# Patient Record
Sex: FEMALE | Race: WHITE | Employment: FULL TIME | URBAN - NONMETROPOLITAN AREA
[De-identification: names, ages, dates, MRNs, and addresses within clinical notes are randomized per-mention and may not be internally consistent; named-entity substitution may affect disease eponyms.]

---

## 2017-07-19 ENCOUNTER — HOSPITAL ENCOUNTER (OUTPATIENT)
Dept: WOMENS IMAGING | Age: 42
Discharge: HOME | End: 2017-07-19
Payer: COMMERCIAL

## 2017-07-19 DIAGNOSIS — Z12.31 ENCOUNTER FOR SCREENING MAMMOGRAM FOR BREAST CANCER: ICD-10-CM

## 2017-07-19 PROCEDURE — G0202 SCR MAMMO BI INCL CAD: HCPCS

## 2018-04-26 ENCOUNTER — OFFICE VISIT (OUTPATIENT)
Dept: OBGYN | Age: 43
End: 2018-04-26
Payer: COMMERCIAL

## 2018-04-26 VITALS
DIASTOLIC BLOOD PRESSURE: 62 MMHG | BODY MASS INDEX: 25.99 KG/M2 | HEIGHT: 65 IN | SYSTOLIC BLOOD PRESSURE: 114 MMHG | WEIGHT: 156 LBS

## 2018-04-26 DIAGNOSIS — Z00.00 WELLNESS EXAMINATION: Primary | ICD-10-CM

## 2018-04-26 DIAGNOSIS — Z01.419 WOMEN'S ANNUAL ROUTINE GYNECOLOGICAL EXAMINATION: ICD-10-CM

## 2018-04-26 PROCEDURE — 99396 PREV VISIT EST AGE 40-64: CPT | Performed by: ADVANCED PRACTICE MIDWIFE

## 2018-04-26 ASSESSMENT — PATIENT HEALTH QUESTIONNAIRE - PHQ9
2. FEELING DOWN, DEPRESSED OR HOPELESS: 1
SUM OF ALL RESPONSES TO PHQ9 QUESTIONS 1 & 2: 2
1. LITTLE INTEREST OR PLEASURE IN DOING THINGS: 1
SUM OF ALL RESPONSES TO PHQ QUESTIONS 1-9: 2

## 2018-04-26 ASSESSMENT — ENCOUNTER SYMPTOMS
CONSTIPATION: 0
SHORTNESS OF BREATH: 0
ABDOMINAL PAIN: 0
VOMITING: 0
NAUSEA: 0
BACK PAIN: 0
DIARRHEA: 0

## 2019-05-02 ENCOUNTER — OFFICE VISIT (OUTPATIENT)
Dept: OBGYN | Age: 44
End: 2019-05-02
Payer: COMMERCIAL

## 2019-05-02 ENCOUNTER — HOSPITAL ENCOUNTER (OUTPATIENT)
Age: 44
Setting detail: SPECIMEN
Discharge: HOME | End: 2019-05-02
Payer: COMMERCIAL

## 2019-05-02 VITALS
HEIGHT: 65 IN | SYSTOLIC BLOOD PRESSURE: 116 MMHG | WEIGHT: 158.6 LBS | BODY MASS INDEX: 26.42 KG/M2 | DIASTOLIC BLOOD PRESSURE: 78 MMHG

## 2019-05-02 DIAGNOSIS — Z00.00 WELLNESS EXAMINATION: Primary | ICD-10-CM

## 2019-05-02 DIAGNOSIS — Z01.419 ENCOUNTER FOR ANNUAL ROUTINE GYNECOLOGICAL EXAMINATION: ICD-10-CM

## 2019-05-02 LAB — HGB, POC: 14.1

## 2019-05-02 PROCEDURE — G0145 SCR C/V CYTO,THINLAYER,RESCR: HCPCS

## 2019-05-02 PROCEDURE — 99396 PREV VISIT EST AGE 40-64: CPT | Performed by: ADVANCED PRACTICE MIDWIFE

## 2019-05-02 ASSESSMENT — PATIENT HEALTH QUESTIONNAIRE - PHQ9
2. FEELING DOWN, DEPRESSED OR HOPELESS: 0
SUM OF ALL RESPONSES TO PHQ9 QUESTIONS 1 & 2: 0
1. LITTLE INTEREST OR PLEASURE IN DOING THINGS: 0
SUM OF ALL RESPONSES TO PHQ QUESTIONS 1-9: 0
SUM OF ALL RESPONSES TO PHQ QUESTIONS 1-9: 0

## 2019-05-02 ASSESSMENT — ENCOUNTER SYMPTOMS
BACK PAIN: 0
SORE THROAT: 0
SHORTNESS OF BREATH: 0
NAUSEA: 0
ABDOMINAL PAIN: 0
DIARRHEA: 0
CONSTIPATION: 0

## 2019-05-02 NOTE — PROGRESS NOTES
YEARLY PHYSICAL    Date of service: 2019    Cruz Ramirez  Is a 41 y.o.   female    PT's PCP is: Moe Corea MD     : 1975                                             Subjective:       Patient's last menstrual period was 04/10/2019 (exact date). Are your menses regular: yes    OB History    Para Term  AB Living   2 2 2 0 0 2   SAB TAB Ectopic Molar Multiple Live Births   0 0 0   0        # Outcome Date GA Lbr Kyle/2nd Weight Sex Delivery Anes PTL Lv   2 Term  39w0d  8 lb 5 oz (3.771 kg) F CS-Unspec      1 Term  39w0d  7 lb 12 oz (3.515 kg) F CS-Unspec         Birth Comments: decr FHR        Social History     Tobacco Use   Smoking Status Never Smoker   Smokeless Tobacco Never Used        Social History     Substance and Sexual Activity   Alcohol Use Yes    Comment: social       Family History   Problem Relation Age of Onset    Breast Cancer Maternal Grandmother     Diabetes Sister     Diabetes Other     Diabetes Father     Diabetes Sister     Diabetes Brother        Any family history of breast or ovarian cancer: Yes    Any family history of blood clots: No      Allergies: Corn-containing products; Pineapple; and Shellfish-derived products    No current outpatient medications on file.     Social History     Substance and Sexual Activity   Sexual Activity Yes    Partners: Male    Birth control/protection: Surgical       Any bleeding or pain with intercourse: No    Last Yearly:  2018    Last pap: 2017    Last HPV: 2016    Last Mammogram: 2017    Last Dexascan na    Last colorectal screen- type:na*  date  na    Do you do self breast exams: No    Past Medical History:   Diagnosis Date    Fibromyalgia     Thyroid disease        Past Surgical History:   Procedure Laterality Date    BREAST REDUCTION SURGERY  2010     SECTION  ,     TUBAL LIGATION         Family History Problem Relation Age of Onset    Breast Cancer Maternal Grandmother     Diabetes Sister     Diabetes Other     Diabetes Father     Diabetes Sister     Diabetes Brother        Chief Complaint   Patient presents with    Gynecologic Exam     Yearly exam. Last pap04/21/2017 negative, last HPV 2016 not detected. PE:  Vital Signs  Blood pressure 116/78, height 5' 5\" (1.651 m), weight 158 lb 9.6 oz (71.9 kg), last menstrual period 04/10/2019, not currently breastfeeding. Labs:    No results found for this visit on 05/02/19. NURSE: Naveen MILLAN    HPI: here for annual exam, denies c/o    Review of Systems   Constitutional: Negative. HENT: Negative for congestion and sore throat. Respiratory: Negative for shortness of breath. Cardiovascular: Negative for chest pain. Gastrointestinal: Negative for abdominal pain, constipation, diarrhea and nausea. Genitourinary: Negative for dysuria. Musculoskeletal: Negative for back pain. Skin: Negative for rash. Neurological: Negative for headaches. Psychiatric/Behavioral: The patient is not nervous/anxious. Objective  Lymphatic:   no lymphadenopathy  Heent:   negative   Cor: regular rate and rhythm, no murmurs              Pul:clear to auscultation bilaterally- no wheezes, rales or rhonchi, normal air movement, no respiratory distress      GI: Abdomen soft, non-tender. BS normal. No masses,  No organomegaly           Extremities: normal strength, tone, and muscle mass   Breasts: Breast:normal appearance, no masses or tenderness   Pelvic Exam: GENITAL/URINARY:  External Genitalia:  General appearance; normal, Hair distribution; normal, Lesions absent  Urethral Meatus:  Size normal, Location normal, Lesions absent, Prolapse absent  Urethra:   Fullness absent, Masses absent  Bladder:  Fullness absent, Masses absent, Tenderness absent, Cystocele absent  Vagina:  General appearance normal, Estrogen effect normal, Discharge absent, Lesions absent, Pelvic support normal  Cervix:  General appearance normal, Lesions absent, Discharge absent, Tenderness absent, Enlargement absent, Nodularity absent  Uterus:  Size normal, Tenderness absent  Adenexa: Masses absent, Tenderness absent  Anus/Perineum:  Lesions absent and Masses absent                                    Vaginal discharge: no vaginal discharge                            Assessment and Plan          Diagnosis Orders   1. Wellness examination  POCT hemoglobin   2. Encounter for annual routine gynecological examination  PAP SMEAR    SHER DIGITAL SCREEN W CAD BILATERAL             Nimo More does not currently have medications on file. Return in about 1 year (around 5/2/2020) for yearly. She was also counseled on her preventative health maintenance recommendations and follow-up. There are no Patient Instructions on file for this visit.     Esau Castle,5/2/2019 9:30 AM

## 2019-05-10 LAB — CYTOLOGY REPORT: NORMAL

## 2019-05-22 ENCOUNTER — HOSPITAL ENCOUNTER (OUTPATIENT)
Dept: WOMENS IMAGING | Age: 44
Discharge: HOME | End: 2019-05-24
Payer: COMMERCIAL

## 2019-05-22 DIAGNOSIS — Z01.419 ENCOUNTER FOR ANNUAL ROUTINE GYNECOLOGICAL EXAMINATION: ICD-10-CM

## 2019-05-22 PROCEDURE — 77063 BREAST TOMOSYNTHESIS BI: CPT

## 2020-09-02 ENCOUNTER — OFFICE VISIT (OUTPATIENT)
Dept: OBGYN | Age: 45
End: 2020-09-02
Payer: COMMERCIAL

## 2020-09-02 VITALS
SYSTOLIC BLOOD PRESSURE: 118 MMHG | HEIGHT: 65 IN | BODY MASS INDEX: 25.66 KG/M2 | WEIGHT: 154 LBS | DIASTOLIC BLOOD PRESSURE: 74 MMHG

## 2020-09-02 PROCEDURE — 99396 PREV VISIT EST AGE 40-64: CPT | Performed by: ADVANCED PRACTICE MIDWIFE

## 2020-09-02 ASSESSMENT — PATIENT HEALTH QUESTIONNAIRE - PHQ9
2. FEELING DOWN, DEPRESSED OR HOPELESS: 1
SUM OF ALL RESPONSES TO PHQ QUESTIONS 1-9: 2
1. LITTLE INTEREST OR PLEASURE IN DOING THINGS: 1
SUM OF ALL RESPONSES TO PHQ9 QUESTIONS 1 & 2: 2
SUM OF ALL RESPONSES TO PHQ QUESTIONS 1-9: 2

## 2020-09-02 NOTE — PROGRESS NOTES
Problem Relation Age of Onset    Breast Cancer Maternal Grandmother     Diabetes Sister     Diabetes Other     Diabetes Father     Diabetes Sister     Diabetes Brother        Chief Complaint   Patient presents with    Gynecologic Exam      Yearly exam. Last pap 05/02/2019 negaitve, last HPV 2016 not detect  ed. C/O longer heavier periods, Patient periods  last 7-8 days khanh has 24 days between periods. PE:  Vital Signs  Blood pressure 118/74, height 5' 5\" (1.651 m), weight 154 lb (69.9 kg), last menstrual period 08/17/2020, not currently breastfeeding. Labs:    No results found for this visit on 09/02/20. NURSE: Jose MILLAN    HPI: hre for annual gyn exam, but c/o heavy irregular menses    Review of Systems   Constitutional: Negative. HENT: Negative for congestion and sore throat. Respiratory: Negative for shortness of breath. Cardiovascular: Negative for chest pain. Gastrointestinal: Negative for abdominal pain. Genitourinary: Positive for menstrual problem. Negative for dysuria. Musculoskeletal: Negative for back pain. Skin: Negative for rash. Neurological: Negative for headaches. Psychiatric/Behavioral: The patient is not nervous/anxious. Reports some mixed feelings with anxieity re: job and potential change         Objective  Lymphatic:   no lymphadenopathy  Heent:   negative   Cor: regular rate and rhythm, no murmurs              Pul:clear to auscultation bilaterally- no wheezes, rales or rhonchi, normal air movement, no respiratory distress      GI: Abdomen soft, non-tender. BS normal. No masses,  No organomegaly           Extremities: normal strength, tone, and muscle mass   Breasts: Breast:normal appearance, no masses or tenderness   Pelvic Exam: GENITAL/URINARY:  External Genitalia:  General appearance; normal, Hair distribution; normal, Lesions absent  Urethral Meatus:  Size normal, Location normal, Lesions absent, Prolapse absent  Urethra:   Fullness absent, Masses absent  Bladder:  Fullness absent, Masses absent, Tenderness absent, Cystocele absent  Vagina:  General appearance normal, Estrogen effect normal, Discharge absent, Lesions absent, Pelvic support normal  Cervix:  General appearance normal, Lesions absent, Discharge absent, Tenderness absent, Enlargement absent, Nodularity absent  Uterus:  Size normal, Tenderness absent  Adenexa: Masses absent, Tenderness absent  Anus/Perineum:  Lesions absent and Masses absent                                                               Assessment and Plan          Diagnosis Orders   1. Encounter for annual routine gynecological examination     2. Screening mammogram, encounter for  SHER DIGITAL SCREEN W OR WO CAD BILATERAL   3. Menorrhagia with irregular cycle  US NON OB TRANSVAGINAL       patient desires ablation      Nimo DAN Minerva Parada does not currently have medications on file. Return in about 2 weeks (around 9/16/2020) for gyn US for and endo bx immediately following next menses,. She was also counseled on her preventative health maintenance recommendations and follow-up. There are no Patient Instructions on file for this visit.     Mesfin Castle,9/4/2020 4:25 PM

## 2020-09-04 ASSESSMENT — ENCOUNTER SYMPTOMS
ABDOMINAL PAIN: 0
SORE THROAT: 0
SHORTNESS OF BREATH: 0
BACK PAIN: 0

## 2020-09-22 ENCOUNTER — PROCEDURE VISIT (OUTPATIENT)
Dept: OBGYN | Age: 45
End: 2020-09-22
Payer: COMMERCIAL

## 2020-09-22 ENCOUNTER — HOSPITAL ENCOUNTER (OUTPATIENT)
Age: 45
Setting detail: SPECIMEN
Discharge: HOME | End: 2020-09-22
Payer: COMMERCIAL

## 2020-09-22 VITALS
SYSTOLIC BLOOD PRESSURE: 112 MMHG | HEIGHT: 65 IN | BODY MASS INDEX: 25.33 KG/M2 | WEIGHT: 152 LBS | DIASTOLIC BLOOD PRESSURE: 70 MMHG

## 2020-09-22 PROCEDURE — 58100 BIOPSY OF UTERUS LINING: CPT | Performed by: ADVANCED PRACTICE MIDWIFE

## 2020-09-22 PROCEDURE — 88305 TISSUE EXAM BY PATHOLOGIST: CPT

## 2020-09-22 NOTE — PROGRESS NOTES
communications  Well-nourished    Speculum exam, cervix visualized, cleaned with betadine, tenaculu applied to anterior lip, pipelle advanced to 11 cm, copious amounts of tissue aspirated and sent to pathology, instruments removed, patient tolerated procedure well                     Results reviewed today:    No results found for this visit on 09/22/20. Assessment and Plan          Diagnosis Orders   1. Menorrhagia with regular cycle  Surgical Pathology    KY BIOPSY OF UTERUS LINING             Nimo Lopez does not currently have medications on file. Return keep appt with Dr. Jordan Wakefield, discuss ablation, for will call with results. She was also counseled on her preventative health maintenance recommendations and follow-up. There are no Patient Instructions on file for this visit.     Yan Castle,9/22/2020 2:33 PM

## 2020-09-24 LAB — SURGICAL PATHOLOGY REPORT: NORMAL

## 2020-10-06 ENCOUNTER — OFFICE VISIT (OUTPATIENT)
Dept: OBGYN | Age: 45
End: 2020-10-06
Payer: COMMERCIAL

## 2020-10-06 VITALS
DIASTOLIC BLOOD PRESSURE: 70 MMHG | WEIGHT: 151.6 LBS | HEIGHT: 66 IN | BODY MASS INDEX: 24.36 KG/M2 | SYSTOLIC BLOOD PRESSURE: 110 MMHG

## 2020-10-06 PROCEDURE — G8484 FLU IMMUNIZE NO ADMIN: HCPCS | Performed by: OBSTETRICS & GYNECOLOGY

## 2020-10-06 PROCEDURE — G8427 DOCREV CUR MEDS BY ELIG CLIN: HCPCS | Performed by: OBSTETRICS & GYNECOLOGY

## 2020-10-06 PROCEDURE — 99213 OFFICE O/P EST LOW 20 MIN: CPT | Performed by: OBSTETRICS & GYNECOLOGY

## 2020-10-06 PROCEDURE — 1036F TOBACCO NON-USER: CPT | Performed by: OBSTETRICS & GYNECOLOGY

## 2020-10-06 PROCEDURE — G8420 CALC BMI NORM PARAMETERS: HCPCS | Performed by: OBSTETRICS & GYNECOLOGY

## 2020-10-06 NOTE — PROGRESS NOTES
DATE OF VISIT:  10/6/20    PATIENT NAME:  Enzo Bence D Johnney Bimler OF BIRTH: 1975    REASON FOR VISIT:    Chief Complaint   Patient presents with    Menstrual Problem     Here to discuss options for her cycles, they are heavy and long, cycles are coming every 24 days and last 8 days (start of one to start of another). First 3 are flooding, wearing a super tampon and heavy pad a bleeding through her clothes, changes pad/tampon every 45-60 minutes, some months the heavier bleeding will last until day 6    Pelvic Pain     One month will be very painful with cramping and the alternate months she will be super emotional        HISTORY OF PRESENT ILLNESS:  Pt here for heavy menses - cycle q 24 days lasting 8 days with 6 days being heavy; at the heaviest having to change pad/tampon q 45 min to an hour; cramping/painful; have been bad for last couple of years    USN showed 10.7x6.4 cm ut with thickened endometrium - 1.6 cm      Patient's last menstrual period was 09/11/2020 (exact date). Vitals:    10/06/20 1543   BP: 110/70   Weight: 151 lb 9.6 oz (68.8 kg)   Height: 5' 6\" (1.676 m)     Body mass index is 24.47 kg/m². Allergies   Allergen Reactions    Corn-Containing Products     Pineapple     Shellfish-Derived Products      No current outpatient medications on file. No current facility-administered medications for this visit.       Social History     Socioeconomic History    Marital status:      Spouse name: None    Number of children: None    Years of education: None    Highest education level: None   Occupational History    None   Social Needs    Financial resource strain: None    Food insecurity     Worry: None     Inability: None    Transportation needs     Medical: None     Non-medical: None   Tobacco Use    Smoking status: Never Smoker    Smokeless tobacco: Never Used   Substance and Sexual Activity    Alcohol use: Yes     Comment: social    Drug use: No    Sexual activity:

## 2020-10-16 ENCOUNTER — HOSPITAL ENCOUNTER (OUTPATIENT)
Dept: WOMENS IMAGING | Age: 45
Discharge: HOME | End: 2020-10-18
Payer: COMMERCIAL

## 2020-10-16 ENCOUNTER — TELEPHONE (OUTPATIENT)
Dept: OBGYN CLINIC | Age: 45
End: 2020-10-16

## 2020-10-16 PROCEDURE — 77063 BREAST TOMOSYNTHESIS BI: CPT

## 2020-10-16 NOTE — TELEPHONE ENCOUNTER
Spoke to patient and confirmed surgery for 12/28/20 at [facility]. She is aware that a nurse from [facility] will call her to complete a phone interview and we will get labs on admission. She will come in to see Dr. Shana Casillas prior to surgery and will follow up 4 weeks after surgery. Appointments scheduled. Patient is an  and will plan to take 12/28 and 12/29 off work, she will let me know if she needs a note for work. Patient verbalized understanding, no further questions/concerns voiced.

## 2020-12-21 ENCOUNTER — HOSPITAL ENCOUNTER (OUTPATIENT)
Dept: PREADMISSION TESTING | Age: 45
Setting detail: SPECIMEN
Discharge: HOME | End: 2020-12-25
Payer: COMMERCIAL

## 2020-12-21 ENCOUNTER — OFFICE VISIT (OUTPATIENT)
Dept: OBGYN | Age: 45
End: 2020-12-21
Payer: COMMERCIAL

## 2020-12-21 VITALS
HEIGHT: 65 IN | SYSTOLIC BLOOD PRESSURE: 122 MMHG | WEIGHT: 152 LBS | BODY MASS INDEX: 25.33 KG/M2 | DIASTOLIC BLOOD PRESSURE: 78 MMHG

## 2020-12-21 PROCEDURE — G8419 CALC BMI OUT NRM PARAM NOF/U: HCPCS | Performed by: OBSTETRICS & GYNECOLOGY

## 2020-12-21 PROCEDURE — G8427 DOCREV CUR MEDS BY ELIG CLIN: HCPCS | Performed by: OBSTETRICS & GYNECOLOGY

## 2020-12-21 PROCEDURE — C9803 HOPD COVID-19 SPEC COLLECT: HCPCS

## 2020-12-21 PROCEDURE — U0003 INFECTIOUS AGENT DETECTION BY NUCLEIC ACID (DNA OR RNA); SEVERE ACUTE RESPIRATORY SYNDROME CORONAVIRUS 2 (SARS-COV-2) (CORONAVIRUS DISEASE [COVID-19]), AMPLIFIED PROBE TECHNIQUE, MAKING USE OF HIGH THROUGHPUT TECHNOLOGIES AS DESCRIBED BY CMS-2020-01-R: HCPCS

## 2020-12-21 PROCEDURE — 1036F TOBACCO NON-USER: CPT | Performed by: OBSTETRICS & GYNECOLOGY

## 2020-12-21 PROCEDURE — G8484 FLU IMMUNIZE NO ADMIN: HCPCS | Performed by: OBSTETRICS & GYNECOLOGY

## 2020-12-21 PROCEDURE — 99213 OFFICE O/P EST LOW 20 MIN: CPT | Performed by: OBSTETRICS & GYNECOLOGY

## 2020-12-21 NOTE — PROGRESS NOTES
DATE OF VISIT:  12/21/20    PATIENT NAME:  Allie Salinas OF BIRTH: 1975    REASON FOR VISIT:    Chief Complaint   Patient presents with    Pre-op Exam     Patient here to sign surgical consents; patient having D&C ablation on 12/28/20. HISTORY OF PRESENT ILLNESS:  Pt here for heavy menses - cycle q 24 days lasting 8 days with 6 days being heavy; at the heaviest having to change pad/tampon q 45 min to an hour; cramping/painful; have been bad for last couple of years     USN showed 10.7x6.4 cm ut with thickened endometrium - 1.6 cm      Patient's last menstrual period was 11/27/2020. Vitals:    12/21/20 1713   BP: 122/78   Weight: 152 lb (68.9 kg)   Height: 5' 5\" (1.651 m)     Body mass index is 25.29 kg/m². Allergies   Allergen Reactions    Corn-Containing Products     Pineapple     Shellfish-Derived Products      No current outpatient medications on file. No current facility-administered medications for this visit.       Social History     Socioeconomic History    Marital status:      Spouse name: None    Number of children: None    Years of education: None    Highest education level: None   Occupational History    None   Social Needs    Financial resource strain: None    Food insecurity     Worry: None     Inability: None    Transportation needs     Medical: None     Non-medical: None   Tobacco Use    Smoking status: Never Smoker    Smokeless tobacco: Never Used   Substance and Sexual Activity    Alcohol use: Yes     Comment: social    Drug use: No    Sexual activity: Yes     Partners: Male     Birth control/protection: Surgical     Comment: TL   Lifestyle    Physical activity     Days per week: None     Minutes per session: None    Stress: None   Relationships    Social connections     Talks on phone: None     Gets together: None     Attends Church service: None     Active member of club or organization: None Attends meetings of clubs or organizations: None     Relationship status: None    Intimate partner violence     Fear of current or ex partner: None     Emotionally abused: None     Physically abused: None     Forced sexual activity: None   Other Topics Concern    None   Social History Narrative    None       REVIEW OF SYSTEMS:  Review of Systems   Constitutional: Negative for chills and fever. Genitourinary: Positive for menstrual problem. Negative for pelvic pain. PHYSICAL EXAM:  /78   Ht 5' 5\" (1.651 m)   Wt 152 lb (68.9 kg)   LMP 11/27/2020   BMI 25.29 kg/m²   Physical Exam  Constitutional:       Appearance: Normal appearance. HENT:      Head: Normocephalic and atraumatic. Neck:      Musculoskeletal: Normal range of motion. Cardiovascular:      Rate and Rhythm: Normal rate. Pulmonary:      Effort: Pulmonary effort is normal.   Musculoskeletal: Normal range of motion. Neurological:      Mental Status: She is alert and oriented to person, place, and time. Skin:     General: Skin is warm and dry. Psychiatric:         Mood and Affect: Mood normal.         Behavior: Behavior normal.         Thought Content: Thought content normal.         Judgment: Judgment normal.         ASSESSMENT:      1. Menorrhagia with regular cycle    2. Dysmenorrhea    3. Enlarged uterus        PLAN:  No orders of the defined types were placed in this encounter. Return in about 1 week (around 12/28/2020) for hysteroscopy d&c bertha.        Electronically signed by Robinson Newton DO on 12/21/20

## 2020-12-22 LAB
SARS-COV-2, RAPID: NORMAL
SARS-COV-2: NORMAL
SARS-COV-2: NOT DETECTED
SOURCE: NORMAL

## 2020-12-23 ENCOUNTER — ANESTHESIA EVENT (OUTPATIENT)
Dept: OPERATING ROOM | Age: 45
End: 2020-12-23
Payer: COMMERCIAL

## 2020-12-23 ENCOUNTER — TELEPHONE (OUTPATIENT)
Dept: PRIMARY CARE CLINIC | Age: 45
End: 2020-12-23

## 2020-12-28 ENCOUNTER — ANESTHESIA (OUTPATIENT)
Dept: OPERATING ROOM | Age: 45
End: 2020-12-28
Payer: COMMERCIAL

## 2020-12-28 ENCOUNTER — HOSPITAL ENCOUNTER (OUTPATIENT)
Age: 45
Setting detail: OUTPATIENT SURGERY
Discharge: HOME | End: 2020-12-28
Attending: OBSTETRICS & GYNECOLOGY | Admitting: OBSTETRICS & GYNECOLOGY
Payer: COMMERCIAL

## 2020-12-28 VITALS
HEART RATE: 51 BPM | HEIGHT: 65 IN | SYSTOLIC BLOOD PRESSURE: 93 MMHG | BODY MASS INDEX: 25.66 KG/M2 | OXYGEN SATURATION: 99 % | WEIGHT: 154 LBS | DIASTOLIC BLOOD PRESSURE: 90 MMHG | TEMPERATURE: 97.6 F | RESPIRATION RATE: 16 BRPM

## 2020-12-28 VITALS
RESPIRATION RATE: 24 BRPM | DIASTOLIC BLOOD PRESSURE: 78 MMHG | SYSTOLIC BLOOD PRESSURE: 138 MMHG | OXYGEN SATURATION: 100 %

## 2020-12-28 PROCEDURE — 3600000013 HC SURGERY LEVEL 3 ADDTL 15MIN: Performed by: OBSTETRICS & GYNECOLOGY

## 2020-12-28 PROCEDURE — 2500000003 HC RX 250 WO HCPCS: Performed by: NURSE ANESTHETIST, CERTIFIED REGISTERED

## 2020-12-28 PROCEDURE — 2720000010 HC SURG SUPPLY STERILE: Performed by: OBSTETRICS & GYNECOLOGY

## 2020-12-28 PROCEDURE — 6370000000 HC RX 637 (ALT 250 FOR IP): Performed by: NURSE ANESTHETIST, CERTIFIED REGISTERED

## 2020-12-28 PROCEDURE — 3700000000 HC ANESTHESIA ATTENDED CARE: Performed by: OBSTETRICS & GYNECOLOGY

## 2020-12-28 PROCEDURE — 3700000001 HC ADD 15 MINUTES (ANESTHESIA): Performed by: OBSTETRICS & GYNECOLOGY

## 2020-12-28 PROCEDURE — 7100000011 HC PHASE II RECOVERY - ADDTL 15 MIN: Performed by: OBSTETRICS & GYNECOLOGY

## 2020-12-28 PROCEDURE — 2580000003 HC RX 258: Performed by: OBSTETRICS & GYNECOLOGY

## 2020-12-28 PROCEDURE — 3600000003 HC SURGERY LEVEL 3 BASE: Performed by: OBSTETRICS & GYNECOLOGY

## 2020-12-28 PROCEDURE — 2709999900 HC NON-CHARGEABLE SUPPLY: Performed by: OBSTETRICS & GYNECOLOGY

## 2020-12-28 PROCEDURE — 58563 HYSTEROSCOPY ABLATION: CPT | Performed by: OBSTETRICS & GYNECOLOGY

## 2020-12-28 PROCEDURE — 88305 TISSUE EXAM BY PATHOLOGIST: CPT

## 2020-12-28 PROCEDURE — 6370000000 HC RX 637 (ALT 250 FOR IP): Performed by: OBSTETRICS & GYNECOLOGY

## 2020-12-28 PROCEDURE — 7100000010 HC PHASE II RECOVERY - FIRST 15 MIN: Performed by: OBSTETRICS & GYNECOLOGY

## 2020-12-28 PROCEDURE — 6360000002 HC RX W HCPCS: Performed by: NURSE ANESTHETIST, CERTIFIED REGISTERED

## 2020-12-28 RX ORDER — PROPOFOL 10 MG/ML
INJECTION, EMULSION INTRAVENOUS PRN
Status: DISCONTINUED | OUTPATIENT
Start: 2020-12-28 | End: 2020-12-28 | Stop reason: SDUPTHER

## 2020-12-28 RX ORDER — ATROPA BELLADONNA AND OPIUM 16.2; 3 MG/1; MG/1
SUPPOSITORY RECTAL PRN
Status: DISCONTINUED | OUTPATIENT
Start: 2020-12-28 | End: 2020-12-28 | Stop reason: SDUPTHER

## 2020-12-28 RX ORDER — SODIUM CHLORIDE, SODIUM LACTATE, POTASSIUM CHLORIDE, CALCIUM CHLORIDE 600; 310; 30; 20 MG/100ML; MG/100ML; MG/100ML; MG/100ML
INJECTION, SOLUTION INTRAVENOUS CONTINUOUS
Status: DISCONTINUED | OUTPATIENT
Start: 2020-12-28 | End: 2020-12-28 | Stop reason: HOSPADM

## 2020-12-28 RX ORDER — OXYCODONE HYDROCHLORIDE 5 MG/1
5 TABLET ORAL
Status: DISCONTINUED | OUTPATIENT
Start: 2020-12-28 | End: 2020-12-28 | Stop reason: HOSPADM

## 2020-12-28 RX ORDER — PROPOFOL 10 MG/ML
INJECTION, EMULSION INTRAVENOUS CONTINUOUS PRN
Status: DISCONTINUED | OUTPATIENT
Start: 2020-12-28 | End: 2020-12-28 | Stop reason: SDUPTHER

## 2020-12-28 RX ORDER — HYDROCODONE BITARTRATE AND ACETAMINOPHEN 5; 325 MG/1; MG/1
1 TABLET ORAL EVERY 4 HOURS PRN
Qty: 10 TABLET | Refills: 0 | Status: SHIPPED | OUTPATIENT
Start: 2020-12-28 | End: 2020-12-30

## 2020-12-28 RX ORDER — ACETAMINOPHEN 325 MG/1
650 TABLET ORAL ONCE
Status: COMPLETED | OUTPATIENT
Start: 2020-12-28 | End: 2020-12-28

## 2020-12-28 RX ORDER — LIDOCAINE HYDROCHLORIDE 20 MG/ML
INJECTION, SOLUTION EPIDURAL; INFILTRATION; INTRACAUDAL; PERINEURAL PRN
Status: DISCONTINUED | OUTPATIENT
Start: 2020-12-28 | End: 2020-12-28 | Stop reason: SDUPTHER

## 2020-12-28 RX ORDER — FENTANYL CITRATE 50 UG/ML
25 INJECTION, SOLUTION INTRAMUSCULAR; INTRAVENOUS EVERY 5 MIN PRN
Status: DISCONTINUED | OUTPATIENT
Start: 2020-12-28 | End: 2020-12-28 | Stop reason: HOSPADM

## 2020-12-28 RX ORDER — KETOROLAC TROMETHAMINE 30 MG/ML
INJECTION, SOLUTION INTRAMUSCULAR; INTRAVENOUS PRN
Status: DISCONTINUED | OUTPATIENT
Start: 2020-12-28 | End: 2020-12-28 | Stop reason: SDUPTHER

## 2020-12-28 RX ORDER — ONDANSETRON 2 MG/ML
4 INJECTION INTRAMUSCULAR; INTRAVENOUS
Status: DISCONTINUED | OUTPATIENT
Start: 2020-12-28 | End: 2020-12-28 | Stop reason: HOSPADM

## 2020-12-28 RX ORDER — DIMENHYDRINATE 50 MG/1
50 TABLET ORAL ONCE
Status: COMPLETED | OUTPATIENT
Start: 2020-12-28 | End: 2020-12-28

## 2020-12-28 RX ORDER — FENTANYL CITRATE 50 UG/ML
50 INJECTION, SOLUTION INTRAMUSCULAR; INTRAVENOUS EVERY 5 MIN PRN
Status: DISCONTINUED | OUTPATIENT
Start: 2020-12-28 | End: 2020-12-28 | Stop reason: HOSPADM

## 2020-12-28 RX ORDER — KETOROLAC TROMETHAMINE 10 MG/1
10 TABLET, FILM COATED ORAL EVERY 6 HOURS PRN
Qty: 12 TABLET | Refills: 0 | Status: SHIPPED | OUTPATIENT
Start: 2020-12-28 | End: 2021-01-25

## 2020-12-28 RX ORDER — MIDAZOLAM HYDROCHLORIDE 1 MG/ML
INJECTION INTRAMUSCULAR; INTRAVENOUS PRN
Status: DISCONTINUED | OUTPATIENT
Start: 2020-12-28 | End: 2020-12-28 | Stop reason: SDUPTHER

## 2020-12-28 RX ADMIN — SODIUM CHLORIDE, POTASSIUM CHLORIDE, SODIUM LACTATE AND CALCIUM CHLORIDE: 600; 310; 30; 20 INJECTION, SOLUTION INTRAVENOUS at 10:48

## 2020-12-28 RX ADMIN — KETOROLAC TROMETHAMINE 30 MG: 30 INJECTION, SOLUTION INTRAMUSCULAR; INTRAVENOUS at 12:06

## 2020-12-28 RX ADMIN — MIDAZOLAM 1 MG: 1 INJECTION INTRAMUSCULAR; INTRAVENOUS at 11:44

## 2020-12-28 RX ADMIN — ATROPA BELLADONNA AND OPIUM 30 MG: 16.2; 3 SUPPOSITORY RECTAL at 11:53

## 2020-12-28 RX ADMIN — ACETAMINOPHEN 650 MG: 325 TABLET, FILM COATED ORAL at 10:35

## 2020-12-28 RX ADMIN — DIMENHYDRINATE 50 MG: 50 TABLET ORAL at 10:35

## 2020-12-28 RX ADMIN — LIDOCAINE HYDROCHLORIDE 100 MG: 20 INJECTION, SOLUTION EPIDURAL; INFILTRATION; INTRACAUDAL; PERINEURAL at 11:47

## 2020-12-28 RX ADMIN — MIDAZOLAM 1 MG: 1 INJECTION INTRAMUSCULAR; INTRAVENOUS at 11:47

## 2020-12-28 RX ADMIN — PROPOFOL 20 MG: 10 INJECTION, EMULSION INTRAVENOUS at 11:49

## 2020-12-28 RX ADMIN — PROPOFOL 120 MCG/KG/MIN: 10 INJECTION, EMULSION INTRAVENOUS at 11:49

## 2020-12-28 ASSESSMENT — PAIN - FUNCTIONAL ASSESSMENT: PAIN_FUNCTIONAL_ASSESSMENT: 0-10

## 2020-12-28 NOTE — OP NOTE
Preoperative diagnosis: 1. Menorrhagia    Postoperative diagnosis: Same    Procedure: Hysteroscopy D&C with Novasure Endometrial Ablation    Surgeon: Dr. Norvin Goldberg    Assistant:  none    Anesthesia: general    Estimated blood loss: less than 10 mL    Fluids: Lactated Ringer's as per the anesthesia record    Urine:  drained with prep    Condition:  Stable    Complications:  None    Specimen:  Endometrial currettings    Findings: Patient had an approximately 9 cm uterus with a thickened endometrium without any obvious fibroids or polyps. The cavity was 6 x 3 cm in size. Procedure: The patient was taken to the operating room where she was prepped and draped in the usual sterile fashion. A weighted speculum was placed in the patient's vagina and the anterior lip of the cervix was grasped with a single-tooth tenaculum. The cervix was progressively dilated and the uterus was sounded. A hysteroscope was introduced with the findings noted above. The hysteroscope was then removed. A sharp currettage was performed. The specimen was handed off to pathology labelled as endometrial currettings. The NovaSure array was then introduced and deployed. A seating technique was utilized to assess the cavity width. A CO2 test was performed and passed. The ablation was then carried out at 80 W for 80 seconds. Once this was completed, the array was withdrawn into its protective sheath and removed. The tenaculum was released with hemostasis being noted and the weighted speculum was removed. Sponge, lap, needle, and instrument counts were correct ×2. The patient was taken to the recovery area in apparently stable condition.

## 2020-12-28 NOTE — PROGRESS NOTES
Informed anesthesia of low pulse and B/P pt. States I normally run that low. No dizziness or lightheadedness noted.  ok'd for discharge

## 2020-12-28 NOTE — PROGRESS NOTES
Ambulated to bathroom per one assist, gait steady no dizziness or lightheadedness noted. Voided states not very much on lynette pad.

## 2020-12-28 NOTE — H&P
HISTORY AND PHYSICAL             Date: 2020        Patient Name: Fany Jung     YOB: 1975      Age:  39 y.o. Chief Complaint   No chief complaint on file. History Obtained From   patient    History of Present Illness   Pt here for heavy menses - cycle q 24 days lasting 8 days with 6 days being heavy; at the heaviest having to change pad/tampon q 45 min to an hour; cramping/painful; have been bad for last couple of years     USN showed 10.7x6.4 cm ut with thickened endometrium - 1.6 cm    Past Medical History     Past Medical History:   Diagnosis Date    Fibromyalgia     Thyroid disease         Past Surgical History     Past Surgical History:   Procedure Laterality Date    BREAST REDUCTION SURGERY  2010     SECTION  ,     TUBAL LIGATION          Medications Prior to Admission     Prior to Admission medications    Not on File        Allergies   Corn-containing products, Pineapple, and Shellfish-derived products    Social History     Social History     Tobacco History     Smoking Status  Never Smoker    Smokeless Tobacco Use  Never Used          Alcohol History     Alcohol Use Status  Yes Comment  social          Drug Use     Drug Use Status  No          Sexual Activity     Sexually Active  Yes Partners  Male Birth Control/Protection  Surgical Comment  TL                Family History     Family History   Problem Relation Age of Onset    Breast Cancer Maternal Grandmother     Diabetes Sister     Diabetes Other     Diabetes Father     Diabetes Sister     Diabetes Brother        Review of Systems   Review of Systems   Constitutional: Negative for chills and fever. Genitourinary: Positive for menstrual problem and pelvic pain. Physical Exam   Ht 5' 5\" (1.651 m)   Wt 150 lb (68 kg)   LMP 2020   BMI 24.96 kg/m²     Physical Exam  Constitutional:       Appearance: Normal appearance. HENT:      Head: Normocephalic and atraumatic.    Eyes: Extraocular Movements: Extraocular movements intact. Pupils: Pupils are equal, round, and reactive to light. Neck:      Musculoskeletal: Normal range of motion. Cardiovascular:      Rate and Rhythm: Normal rate. Pulmonary:      Effort: Pulmonary effort is normal.   Skin:     General: Skin is warm and dry. Neurological:      Mental Status: She is alert and oriented to person, place, and time. Psychiatric:         Mood and Affect: Mood normal.         Behavior: Behavior normal.         Thought Content: Thought content normal.         Judgment: Judgment normal.         Labs    No results found for this or any previous visit (from the past 24 hour(s)). Imaging/Diagnostics Last 24 Hours   No results found. Assessment      Menorrhagia  Dysmenorrhea    Plan   1.  Hysteroscopy D&C Rudy    Consultations Ordered:  None    Electronically signed by Keri Gibbs DO on 12/28/20 at 10:20 AM EST

## 2020-12-28 NOTE — ANESTHESIA PRE PROCEDURE
Department of Anesthesiology  Preprocedure Note       Name:  Thiago Sample   Age:  39 y.o.  :  1975                                          MRN:  [id number]         Date:  2020      Surgeon: Emily Burk):  David Median, DO    Procedure: Procedure(s):  DILATATION AND CURETTAGE HYSTEROSCOPY CAUTERY ABLATION, NOVASURE    Medications prior to admission:   Prior to Admission medications    Not on File       Current medications:    Current Facility-Administered Medications   Medication Dose Route Frequency Provider Last Rate Last Admin    lactated ringers infusion   Intravenous Continuous Mace Median,  mL/hr at 20 1048 New Bag at 20 1048       Allergies:     Allergies   Allergen Reactions    Corn-Containing Products     Pineapple     Shellfish-Derived Products        Problem List:    Patient Active Problem List   Diagnosis Code    Mastodynia, female N64.4       Past Medical History:        Diagnosis Date    Fibromyalgia     Thyroid disease        Past Surgical History:        Procedure Laterality Date    BREAST REDUCTION SURGERY  2010     SECTION  ,     TUBAL LIGATION         Social History:    Social History     Tobacco Use    Smoking status: Never Smoker    Smokeless tobacco: Never Used   Substance Use Topics    Alcohol use: Yes     Comment: social                                Counseling given: Not Answered      Vital Signs (Current):   Vitals:    20 1026 20 1028   BP:  119/70   Pulse:  72   Resp:  16   Temp:  36.4 °C (97.6 °F)   TempSrc:  Temporal   SpO2:  99%   Weight: 150 lb (68 kg) 154 lb (69.9 kg)   Height: 5' 5\" (1.651 m) 5' 5\" (1.651 m)                                              BP Readings from Last 3 Encounters:   20 119/70   20 122/78   10/06/20 110/70       NPO Status: Time of last liquid consumption:                         Time of last solid consumption:  Date of last liquid consumption: 12/27/20                        Date of last solid food consumption: 12/27/20    BMI:   Wt Readings from Last 3 Encounters:   12/28/20 154 lb (69.9 kg)   12/21/20 152 lb (68.9 kg)   10/06/20 151 lb 9.6 oz (68.8 kg)     Body mass index is 25.63 kg/m². CBC:   Lab Results   Component Value Date    WBC 6.4 02/24/2012    RBC 4.59 02/24/2012    HGB 14.1 05/02/2019    HGB 14.0 02/24/2012    HCT 40.3 02/24/2012    MCV 87.8 02/24/2012    RDW 12.9 02/24/2012     02/24/2012       CMP:   Lab Results   Component Value Date     02/24/2012    K 4.1 02/24/2012     02/24/2012    CO2 28 02/24/2012    BUN 17 02/24/2012    CREATININE 0.56 02/24/2012    GFRAA >60 02/24/2012    LABGLOM >60 02/24/2012    GLUCOSE 89 02/24/2012    CALCIUM 9.2 02/24/2012    BILITOT 0.80 02/24/2012    ALKPHOS 43 02/24/2012    AST 19 02/24/2012    ALT 18 02/24/2012       POC Tests: No results for input(s): POCGLU, POCNA, POCK, POCCL, POCBUN, POCHEMO, POCHCT in the last 72 hours.     Coags: No results found for: PROTIME, INR, APTT    HCG (If Applicable): No results found for: PREGTESTUR, PREGSERUM, HCG, HCGQUANT     ABGs: No results found for: PHART, PO2ART, BEL4EKQ, BOW0DKF, BEART, C2TVFQFA     Type & Screen (If Applicable):  No results found for: LABABO, LABRH    Drug/Infectious Status (If Applicable):  No results found for: HIV, HEPCAB    COVID-19 Screening (If Applicable):   Lab Results   Component Value Date    COVID19 Not Detected 12/21/2020         Anesthesia Evaluation   no history of anesthetic complications:   Airway: Mallampati: II  TM distance: <3 FB   Neck ROM: full  Mouth opening: > = 3 FB Dental: normal exam         Pulmonary:Negative Pulmonary ROS and normal exam  breath sounds clear to auscultation                             Cardiovascular:Negative CV ROS  Exercise tolerance: good (>4 METS),                     Neuro/Psych:   Negative Neuro/Psych ROS GI/Hepatic/Renal: Neg GI/Hepatic/Renal ROS            Endo/Other: Negative Endo/Other ROS                    Abdominal:           Vascular: negative vascular ROS. Anesthesia Plan      general and TIVA     ASA 1       Induction: intravenous. Anesthetic plan and risks discussed with patient.                       JIMBO Barnes - CRNA   12/28/2020

## 2020-12-28 NOTE — PROGRESS NOTES
Discharge instructions given to patient and  with understanding voiced.  No questions asked at this time

## 2020-12-28 NOTE — ANESTHESIA POSTPROCEDURE EVALUATION
Department of Anesthesiology  Postprocedure Note    Patient: Catrina Ward  MRN: [id number]  YOB: 1975  Date of evaluation: 12/28/2020  Time:  12:50 PM     Procedure Summary     Date: 12/28/20 Room / Location: [address]    Anesthesia Start: 1347 Anesthesia Stop: 7731    Procedure: DILATATION AND CURETTAGE HYSTEROSCOPY CAUTERY ABLATION, NOVASURE (N/A ) Diagnosis: (MENORRHAGIA, DYMENORRHEA)    Surgeons: Jorge Mcmahan DO Responsible Provider: Mya Batista    Anesthesia Type: general, TIVA ASA Status: 1          Anesthesia Type: general, TIVA    Reta Phase I:      Reta Phase II:      Last vitals: Reviewed and per EMR flowsheets.        Anesthesia Post Evaluation    Patient location during evaluation: PACU  Patient participation: complete - patient participated  Level of consciousness: awake and alert  Pain score: 0  Airway patency: patent  Nausea & Vomiting: no nausea and no vomiting  Complications: no  Cardiovascular status: blood pressure returned to baseline  Respiratory status: acceptable  Hydration status: euvolemic

## 2020-12-30 LAB — SURGICAL PATHOLOGY REPORT: NORMAL

## 2021-01-25 ENCOUNTER — OFFICE VISIT (OUTPATIENT)
Dept: OBGYN | Age: 46
End: 2021-01-25
Payer: COMMERCIAL

## 2021-01-25 VITALS
HEIGHT: 65 IN | WEIGHT: 158 LBS | SYSTOLIC BLOOD PRESSURE: 118 MMHG | BODY MASS INDEX: 26.33 KG/M2 | DIASTOLIC BLOOD PRESSURE: 72 MMHG

## 2021-01-25 DIAGNOSIS — N94.6 DYSMENORRHEA: ICD-10-CM

## 2021-01-25 DIAGNOSIS — N92.0 MENORRHAGIA WITH REGULAR CYCLE: Primary | ICD-10-CM

## 2021-01-25 DIAGNOSIS — N84.0 POLYP OF CORPUS UTERI: ICD-10-CM

## 2021-01-25 PROCEDURE — 99213 OFFICE O/P EST LOW 20 MIN: CPT | Performed by: OBSTETRICS & GYNECOLOGY

## 2021-01-25 NOTE — PROGRESS NOTES
DATE OF VISIT:  1/25/21    PATIENT NAME:  oziel Bhandarimichelle RENEA Albert OF BIRTH: 1975    REASON FOR VISIT:    Chief Complaint   Patient presents with    Post-Op Check     Patient here for post-op check; patient had D&C Ablation on 12/28/20; patient had a period last Monday-Thursday but a lot lighter than what her cycles used to be        HISTORY OF PRESENT ILLNESS:  Pt here in f/u from [facility] - had cycle that lasted 4 days but lighter - pathology of polyp reviewed with TC      No LMP recorded. Patient has had an ablation. Vitals:    01/25/21 1705   BP: 118/72   Weight: 158 lb (71.7 kg)   Height: 5' 5\" (1.651 m)     Body mass index is 26.29 kg/m². Allergies   Allergen Reactions    Corn-Containing Products     Pineapple     Shellfish-Derived Products      No current outpatient medications on file. No current facility-administered medications for this visit.       Social History     Socioeconomic History    Marital status:      Spouse name: None    Number of children: None    Years of education: None    Highest education level: None   Occupational History    None   Social Needs    Financial resource strain: None    Food insecurity     Worry: None     Inability: None    Transportation needs     Medical: None     Non-medical: None   Tobacco Use    Smoking status: Never Smoker    Smokeless tobacco: Never Used   Substance and Sexual Activity    Alcohol use: Yes     Comment: social    Drug use: No    Sexual activity: Yes     Partners: Male     Birth control/protection: Surgical     Comment: TL   Lifestyle    Physical activity     Days per week: None     Minutes per session: None    Stress: None   Relationships    Social connections     Talks on phone: None     Gets together: None     Attends Jew service: None     Active member of club or organization: None     Attends meetings of clubs or organizations: None     Relationship status: None    Intimate partner violence Fear of current or ex partner: None     Emotionally abused: None     Physically abused: None     Forced sexual activity: None   Other Topics Concern    None   Social History Narrative    None       REVIEW OF SYSTEMS:  Review of Systems   Constitutional: Negative for chills and fever. Genitourinary: Positive for menstrual problem (had cycle - lighter). Negative for pelvic pain and vaginal bleeding. PHYSICAL EXAM:  /72   Ht 5' 5\" (1.651 m)   Wt 158 lb (71.7 kg)   BMI 26.29 kg/m²   Physical Exam  Constitutional:       Appearance: Normal appearance. Neurological:      Mental Status: She is alert. ASSESSMENT:      1. Menorrhagia with regular cycle    2. Dysmenorrhea    3. Polyp of corpus uteri        PLAN:  No orders of the defined types were placed in this encounter.     Return in about 8 months (around 9/25/2021) for annual.       Electronically signed by Sydnie Posadas DO on 01/25/21

## 2021-04-30 ENCOUNTER — HOSPITAL ENCOUNTER (OUTPATIENT)
Age: 46
Discharge: HOME | End: 2021-04-30
Payer: COMMERCIAL

## 2021-04-30 PROCEDURE — 36415 COLL VENOUS BLD VENIPUNCTURE: CPT

## 2021-04-30 PROCEDURE — 86003 ALLG SPEC IGE CRUDE XTRC EA: CPT

## 2021-05-04 LAB
ALLERGEN CLAMS IGE: <0.1 KU/L (ref 0–0.34)
ALLERGEN CODFISH IGE: <0.1 KU/L (ref 0–0.34)
ALLERGEN CORN IGE: <0.1 KU/L (ref 0–0.34)
ALLERGEN CRAB IGE: <0.1 KU/L (ref 0–0.34)
ALLERGEN LOBSTER IGE: <0.1 KU/L (ref 0–0.34)
ALLERGEN OYSTER IGE: <0.1 KU/L (ref 0–0.34)
ALLERGEN PORK: <0.1 KU/L (ref 0–0.34)
ALLERGEN SCALLOP IGE: <0.1 KU/L (ref 0–0.34)
ALLERGEN SOYBEAN IGE: <0.1 KU/L (ref 0–0.34)
ALLERGEN TUNA IGE: <0.1 KU/L (ref 0–0.34)
ALLERGEN WHEAT IGE: <0.1 KU/L (ref 0–0.34)
SALMON IGE: <0.1 KU/L (ref 0–0.34)
SHRIMP: <0.1 KU/L (ref 0–0.34)

## 2021-05-05 LAB — ALLERGEN MACKEREL IGE: <0.1 KU/L (ref 0–0.34)

## 2021-05-10 LAB
-: NORMAL
ALLERGEN SPECIAL IGE AB: NORMAL

## 2023-04-06 ENCOUNTER — HOSPITAL ENCOUNTER (OUTPATIENT)
Age: 48
Setting detail: SPECIMEN
Discharge: HOME | End: 2023-04-06
Payer: COMMERCIAL

## 2023-04-06 ENCOUNTER — OFFICE VISIT (OUTPATIENT)
Dept: OBGYN | Age: 48
End: 2023-04-06

## 2023-04-06 VITALS
BODY MASS INDEX: 26.62 KG/M2 | SYSTOLIC BLOOD PRESSURE: 122 MMHG | DIASTOLIC BLOOD PRESSURE: 70 MMHG | WEIGHT: 159.8 LBS | HEIGHT: 65 IN

## 2023-04-06 DIAGNOSIS — Z12.4 SCREENING FOR MALIGNANT NEOPLASM OF CERVIX: ICD-10-CM

## 2023-04-06 DIAGNOSIS — Z01.419 ENCOUNTER FOR ANNUAL ROUTINE GYNECOLOGICAL EXAMINATION: Primary | ICD-10-CM

## 2023-04-06 DIAGNOSIS — Z12.31 SCREENING MAMMOGRAM, ENCOUNTER FOR: ICD-10-CM

## 2023-04-06 PROCEDURE — 87624 HPV HI-RISK TYP POOLED RSLT: CPT

## 2023-04-06 PROCEDURE — G0145 SCR C/V CYTO,THINLAYER,RESCR: HCPCS

## 2023-04-06 NOTE — PROGRESS NOTES
never    Do you do self breast exams: No    Past Medical History:   Diagnosis Date    Fibromyalgia     Thyroid disease        Past Surgical History:   Procedure Laterality Date    BREAST REDUCTION SURGERY  2010     SECTION  , 2005    DILATION AND CURETTAGE OF UTERUS  2020    Dr. Rosa Anguiano - Rudy, Hysterscopy    DILATION AND CURETTAGE OF UTERUS N/A 2020    DILATATION AND CURETTAGE HYSTEROSCOPY CAUTERY Griselda Diazo [facility] Lou[facility]          Family History   Problem Relation Age of Onset    Diabetes Father     Diabetes Sister     Diabetes Sister     Diabetes Brother     Breast Cancer Maternal Grandmother     Diabetes Paternal Grandmother     Diabetes Paternal Grandfather     Diabetes Other        Chief Complaint   Patient presents with    Gynecologic Exam     Yearly exam. Last pap 2019 negative, HPV 2016 not detected. PE:  Vital Signs  Blood pressure 122/70, height 5' 5\" (1.651 m), weight 159 lb 12.8 oz (72.5 kg), last menstrual period 2023, not currently breastfeeding. Estimated body mass index is 26.59 kg/m² as calculated from the following:    Height as of this encounter: 5' 5\" (1.651 m). Weight as of this encounter: 159 lb 12.8 oz (72.5 kg). Labs:    No results found for this visit on 23. No data recorded    NURSE: Naveen MILLAN    HPI: here for annual gyn exam, denies c/o    Review of Systems   Constitutional: Negative. HENT:  Negative for congestion. Respiratory:  Negative for shortness of breath. Cardiovascular:  Negative for chest pain. Gastrointestinal:  Negative for abdominal pain. Genitourinary:  Negative for dysuria. Musculoskeletal:  Negative for back pain. Skin:  Negative for rash. Neurological:  Negative for headaches. Psychiatric/Behavioral:  The patient is not nervous/anxious.         Objective  Lymphatic:   no lymphadenopathy  Heent:

## 2023-04-08 LAB
HPV SAMPLE: NORMAL
HPV, GENOTYPE 16: NOT DETECTED
HPV, GENOTYPE 18: NOT DETECTED
HPV, HIGH RISK OTHER: NOT DETECTED
HPV, INTERPRETATION: NORMAL
SPECIMEN DESCRIPTION: NORMAL

## 2023-04-08 ASSESSMENT — ENCOUNTER SYMPTOMS
BACK PAIN: 0
SHORTNESS OF BREATH: 0
ABDOMINAL PAIN: 0

## 2023-06-02 ENCOUNTER — HOSPITAL ENCOUNTER (OUTPATIENT)
Dept: WOMENS IMAGING | Age: 48
Discharge: HOME | End: 2023-06-02
Payer: COMMERCIAL

## 2023-06-02 DIAGNOSIS — Z12.31 SCREENING MAMMOGRAM, ENCOUNTER FOR: ICD-10-CM

## 2023-06-02 PROCEDURE — 77067 SCR MAMMO BI INCL CAD: CPT

## 2024-06-04 ENCOUNTER — HOSPITAL ENCOUNTER (OUTPATIENT)
Dept: WOMENS IMAGING | Age: 49
Discharge: HOME | End: 2024-06-06
Payer: COMMERCIAL

## 2024-06-04 VITALS — BODY MASS INDEX: 24.99 KG/M2 | WEIGHT: 150 LBS | HEIGHT: 65 IN

## 2024-06-04 DIAGNOSIS — Z12.39 SCREENING BREAST EXAMINATION: ICD-10-CM

## 2024-06-04 PROCEDURE — 77063 BREAST TOMOSYNTHESIS BI: CPT

## 2024-07-31 ENCOUNTER — OFFICE VISIT (OUTPATIENT)
Dept: OBGYN | Age: 49
End: 2024-07-31
Payer: COMMERCIAL

## 2024-07-31 VITALS
HEIGHT: 65 IN | DIASTOLIC BLOOD PRESSURE: 76 MMHG | WEIGHT: 159 LBS | SYSTOLIC BLOOD PRESSURE: 126 MMHG | BODY MASS INDEX: 26.49 KG/M2

## 2024-07-31 DIAGNOSIS — Z12.31 SCREENING MAMMOGRAM, ENCOUNTER FOR: ICD-10-CM

## 2024-07-31 DIAGNOSIS — Z01.419 ENCOUNTER FOR ANNUAL ROUTINE GYNECOLOGICAL EXAMINATION: Primary | ICD-10-CM

## 2024-07-31 PROCEDURE — 99396 PREV VISIT EST AGE 40-64: CPT | Performed by: ADVANCED PRACTICE MIDWIFE

## 2024-07-31 SDOH — ECONOMIC STABILITY: FOOD INSECURITY: WITHIN THE PAST 12 MONTHS, THE FOOD YOU BOUGHT JUST DIDN'T LAST AND YOU DIDN'T HAVE MONEY TO GET MORE.: NEVER TRUE

## 2024-07-31 SDOH — ECONOMIC STABILITY: FOOD INSECURITY: WITHIN THE PAST 12 MONTHS, YOU WORRIED THAT YOUR FOOD WOULD RUN OUT BEFORE YOU GOT MONEY TO BUY MORE.: NEVER TRUE

## 2024-07-31 SDOH — ECONOMIC STABILITY: HOUSING INSECURITY
IN THE LAST 12 MONTHS, WAS THERE A TIME WHEN YOU DID NOT HAVE A STEADY PLACE TO SLEEP OR SLEPT IN A SHELTER (INCLUDING NOW)?: NO

## 2024-07-31 SDOH — ECONOMIC STABILITY: INCOME INSECURITY: HOW HARD IS IT FOR YOU TO PAY FOR THE VERY BASICS LIKE FOOD, HOUSING, MEDICAL CARE, AND HEATING?: NOT HARD AT ALL

## 2024-07-31 ASSESSMENT — PATIENT HEALTH QUESTIONNAIRE - PHQ9
SUM OF ALL RESPONSES TO PHQ QUESTIONS 1-9: 0
1. LITTLE INTEREST OR PLEASURE IN DOING THINGS: NOT AT ALL
SUM OF ALL RESPONSES TO PHQ QUESTIONS 1-9: 0
SUM OF ALL RESPONSES TO PHQ9 QUESTIONS 1 & 2: 0
2. FEELING DOWN, DEPRESSED OR HOPELESS: NOT AT ALL

## 2024-07-31 ASSESSMENT — ENCOUNTER SYMPTOMS
SHORTNESS OF BREATH: 0
ABDOMINAL PAIN: 0
BACK PAIN: 0

## 2024-07-31 NOTE — PROGRESS NOTES
YEARLY PHYSICAL    Date of service: 2024    Nimo Dempsey  Is a 49 y.o.  , female    PT's PCP is: Checo Howard MD     : 1975                                             Subjective:       Patient's last menstrual period was 2024 (exact date).     Are your menses regular: yes    OB History    Para Term  AB Living   2 2 2 0 0 2   SAB IAB Ectopic Molar Multiple Live Births   0 0 0   0        # Outcome Date GA Lbr Kyle/2nd Weight Sex Delivery Anes PTL Lv   2 Term  39w0d  3.771 kg (8 lb 5 oz) F CS-Unspec      1 Term  39w0d  3.515 kg (7 lb 12 oz) F CS-Unspec         Birth Comments: decr FHR        Social History     Tobacco Use   Smoking Status Never   Smokeless Tobacco Never        Social History     Substance and Sexual Activity   Alcohol Use Yes    Comment: social       Family History   Problem Relation Age of Onset    Diabetes Father     Diabetes Sister     Diabetes Sister     Diabetes Brother     Breast Cancer Maternal Grandmother     Diabetes Paternal Grandmother     Diabetes Paternal Grandfather     Diabetes Other        Any family history of breast or ovarian cancer: No    Any family history of blood clots: No    Allergies: Corn-containing products, Pineapple, and Shellfish-derived products    No current outpatient medications on file.    Social History     Substance and Sexual Activity   Sexual Activity Not Currently    Partners: Male    Birth control/protection: Surgical    Comment: TL       Any bleeding or pain with intercourse: NA    Last Yearly date:  2023    Last pap date : Date of last Cervical Cancer screen (HPV or PAP): 2023     results: negative    Last HPV date and results:   2023 negative  Has pt ever had an abnormal:No    IF yes result and date: never     Mammogram Result (most recent):  SHER DIGITAL SCREEN SELF REFERRAL W OR WO CAD BILATERAL

## 2025-06-19 ENCOUNTER — HOSPITAL ENCOUNTER (OUTPATIENT)
Dept: WOMENS IMAGING | Age: 50
Discharge: HOME | End: 2025-06-21
Payer: COMMERCIAL

## 2025-06-19 DIAGNOSIS — Z12.31 SCREENING MAMMOGRAM, ENCOUNTER FOR: ICD-10-CM

## 2025-06-19 PROCEDURE — 77063 BREAST TOMOSYNTHESIS BI: CPT

## 2025-08-05 ENCOUNTER — OFFICE VISIT (OUTPATIENT)
Dept: OBGYN | Age: 50
End: 2025-08-05
Payer: COMMERCIAL

## 2025-08-05 VITALS
DIASTOLIC BLOOD PRESSURE: 74 MMHG | HEIGHT: 65 IN | BODY MASS INDEX: 26.69 KG/M2 | WEIGHT: 160.2 LBS | SYSTOLIC BLOOD PRESSURE: 122 MMHG

## 2025-08-05 DIAGNOSIS — Z12.11 SCREEN FOR COLON CANCER: ICD-10-CM

## 2025-08-05 DIAGNOSIS — Z00.00 WELLNESS EXAMINATION: ICD-10-CM

## 2025-08-05 DIAGNOSIS — Z01.419 ENCOUNTER FOR ANNUAL ROUTINE GYNECOLOGICAL EXAMINATION: Primary | ICD-10-CM

## 2025-08-05 DIAGNOSIS — Z12.31 SCREENING MAMMOGRAM, ENCOUNTER FOR: ICD-10-CM

## 2025-08-05 PROCEDURE — 99396 PREV VISIT EST AGE 40-64: CPT | Performed by: ADVANCED PRACTICE MIDWIFE

## 2025-08-05 SDOH — ECONOMIC STABILITY: FOOD INSECURITY: WITHIN THE PAST 12 MONTHS, THE FOOD YOU BOUGHT JUST DIDN'T LAST AND YOU DIDN'T HAVE MONEY TO GET MORE.: NEVER TRUE

## 2025-08-05 SDOH — ECONOMIC STABILITY: FOOD INSECURITY: WITHIN THE PAST 12 MONTHS, YOU WORRIED THAT YOUR FOOD WOULD RUN OUT BEFORE YOU GOT MONEY TO BUY MORE.: NEVER TRUE

## 2025-08-05 ASSESSMENT — PATIENT HEALTH QUESTIONNAIRE - PHQ9
SUM OF ALL RESPONSES TO PHQ QUESTIONS 1-9: 0
2. FEELING DOWN, DEPRESSED OR HOPELESS: NOT AT ALL
1. LITTLE INTEREST OR PLEASURE IN DOING THINGS: NOT AT ALL
SUM OF ALL RESPONSES TO PHQ QUESTIONS 1-9: 0

## 2025-08-05 ASSESSMENT — ENCOUNTER SYMPTOMS
BACK PAIN: 0
ABDOMINAL PAIN: 0
SHORTNESS OF BREATH: 0

## 2025-08-22 LAB — NONINV COLON CA DNA+OCC BLD SCRN STL QL: NEGATIVE

## (undated) DEVICE — SOLUTION IV IRRIG POUR BRL 0.9% SODIUM CHL 2F7124

## (undated) DEVICE — 1000ML,PRESSURE INFUSER W/STOPCOCK: Brand: MEDLINE

## (undated) DEVICE — Z DISCONTINUED NO SUB IDED DEVICE ABLAT ENDOMET UTER SOUNDING ES SURESOUND NOVASURE

## (undated) DEVICE — COVER LT HNDL BLU PLAS

## (undated) DEVICE — GOWN,SIRUS,POLYRNF,BRTHSLV,XL,30/CS: Brand: MEDLINE

## (undated) DEVICE — SOLUTION IV 1000ML 0.9% SOD CHL FOR IRRIG PLAS CONT

## (undated) DEVICE — Y-TYPE TUR/BLADDER IRRIGATION SET, REGULATING CLAMP

## (undated) DEVICE — CATHETER URETH 16FR L16IN RED RUB INTMIT ROB MOD BARDX

## (undated) DEVICE — TOWEL,OR,DSP,ST,BLUE,STD,4/PK,20PK/CS: Brand: MEDLINE

## (undated) DEVICE — Z DISCONTINUED BY MEDLINE USE 2711682 TRAY SKIN PREP DRY W/ PREM GLV

## (undated) DEVICE — GAUZE,SPONGE,4"X4",16PLY,XRAY,STRL,LF: Brand: MEDLINE

## (undated) DEVICE — SOLUTION SURG PREP ANTIMICROBIAL 4 OZ SKIN WND EXIDINE

## (undated) DEVICE — PREP PAD BNS: Brand: CONVERTORS

## (undated) DEVICE — GLOVE SURG SZ 65 L12IN FNGR THK87MIL WHT LTX FREE

## (undated) DEVICE — TUBING, SUCTION, 9/32" X 12', STRAIGHT: Brand: MEDLINE INDUSTRIES, INC.

## (undated) DEVICE — PAD N ADH W3XL4IN POLY COT SFT PERF FLM EASILY CUT ABSRB

## (undated) DEVICE — PACK,LITHOTOMY: Brand: MEDLINE

## (undated) DEVICE — PEN: MARKING STD 100/CS: Brand: MEDICAL ACTION INDUSTRIES

## (undated) DEVICE — PAD,ABDOMINAL,8"X10",ST,LF: Brand: MEDLINE

## (undated) DEVICE — DRAPE SURG LITH HYSTSCP D AND C STD N FEN N REINF W FLD PCH